# Patient Record
Sex: MALE | Race: WHITE | NOT HISPANIC OR LATINO | Employment: OTHER | URBAN - METROPOLITAN AREA
[De-identification: names, ages, dates, MRNs, and addresses within clinical notes are randomized per-mention and may not be internally consistent; named-entity substitution may affect disease eponyms.]

---

## 2019-09-23 NOTE — PROCEDURE NOTE: SURGICAL
"<span style=""font-weight:bold;"">MR #:</span> [id number]<br /><br /><span style=""font-weight:bold;"">PREOPERATIVE DIAGNOSIS:</span> Cataract

## 2019-10-02 NOTE — PATIENT DISCUSSION
ph of drops slighlty different from patients tears.  Slight sting with insertion of drops.  Eye appears stable.

## 2019-10-07 NOTE — PATIENT DISCUSSION
Cataract surgery has been performed in the first eye and activities of daily living are still impaired. The patient would like to proceed with cataract surgery in the second eye as scheduled. The patient elects Basic OD, goal of Emmetropia.

## 2019-10-07 NOTE — PROCEDURE NOTE: SURGICAL
MR #: [id number]<br /><br />PREOPERATIVE DIAGNOSIS: Cataract, right eye.<br /><br />POSTOPERATIVE DIAGNOSIS: Same.<br /><br />OPERATIVE PROCEDURE: Phacoemulsification with +18.5 diopter Abiel &amp; Abiel AAB00, PC-IOL, right eye.<br /><br />PROCEDURE:&nbsp; Following sedation, Gayatri Mcmillan CRNA, administered topical anesthesia in the form of lidocaine 2% gel as per the anesthetic record. <br /><br />Prior to commencing surgery patient identification, surgical procedure, site, and side were confirmed by Dr. Luiz Chino. &nbsp; The patient was then prepped with Betadine and draped with sterile drapes. A lid speculum was placed and the side port incision prepared. &nbsp; 0.5 cc of Epi-Shugarcaine was instilled and the anterior chamber entered at the temporal 180&deg; limbus in a single plane fashion employing a 2.7 mm trapezoid kevan and ring fixation. Viscoelastic was placed, a circular capsulorhexis performed, hydrodissection accomplished and the nucleus emulsified within the posterior chamber. Cortex was aspirated with the I/A handpiece, the posterior capsule polished and viscoelastic instilled to distend the capsular sac. A +18.5 diopter Abiel &amp; Odessa Dung was placed into the bag under direct visualization without difficulty employing the microinjector. Viscoelastic was aspirated with the I/A handpiece and the anterior chamber pressurized with BSS. The incision was tested for leaks and none were found. A single injection of 0.2 cc of Moxifloxacin was placed in the anterior chamber. The patient returned to the holding area having tolerated the procedure extremely well and without complication. <br /><br />Epi-shugarcaine consists of a mixture of 1cc epinephrine MPF 1:1000, 0.75 cc 4% lidocaine MPF and 2.25 cc BSS. Moxifloxacin consists of a mixture of Vigamox and BSS 1 mg/1 ml solution. <br /><br /><br />

## 2019-10-07 NOTE — PATIENT DISCUSSION
Suggested patient use artificial tears 2 to 3 minutes prior to instilling medication drops to help alleviate stinging.

## 2021-07-26 ENCOUNTER — CATARACT CONSULT (OUTPATIENT)
Dept: URBAN - METROPOLITAN AREA CLINIC 35 | Facility: CLINIC | Age: 76
End: 2021-07-26

## 2021-07-26 DIAGNOSIS — H40.023: ICD-10-CM

## 2021-07-26 DIAGNOSIS — H25.811: ICD-10-CM

## 2021-07-26 DIAGNOSIS — H35.54: ICD-10-CM

## 2021-07-26 DIAGNOSIS — D31.32: ICD-10-CM

## 2021-07-26 DIAGNOSIS — H25.812: ICD-10-CM

## 2021-07-26 PROCEDURE — 92004 COMPRE OPH EXAM NEW PT 1/>: CPT

## 2021-07-26 PROCEDURE — 92134 CPTRZ OPH DX IMG PST SGM RTA: CPT

## 2021-07-26 PROCEDURE — 92015 DETERMINE REFRACTIVE STATE: CPT

## 2021-07-26 PROCEDURE — 92136TC INTERFEROMETRY - TECHNICAL COMPONENT

## 2021-07-26 PROCEDURE — V2799PMN IMPRIMIS PRED-MOXI-NEPAF 5ML

## 2021-07-26 ASSESSMENT — VISUAL ACUITY
OS_BAT: 20/70
OD_CC: 20/40-2
OS_SC: 20/80-1
OD_CC: J3
OS_CC: 20/50-2
OD_BAT: 20/60
OD_SC: 20/70-1
OS_CC: J4

## 2021-07-26 ASSESSMENT — TONOMETRY
OD_IOP_MMHG: 15
OS_IOP_MMHG: 13

## 2021-08-12 ENCOUNTER — SURGERY/PROCEDURE (OUTPATIENT)
Dept: URBAN - METROPOLITAN AREA CLINIC 35 | Facility: CLINIC | Age: 76
End: 2021-08-12

## 2021-08-12 ENCOUNTER — PRE-OP/H&P (OUTPATIENT)
Dept: URBAN - METROPOLITAN AREA SURGERY 14 | Facility: SURGERY | Age: 76
End: 2021-08-12

## 2021-08-12 DIAGNOSIS — H25.812: ICD-10-CM

## 2021-08-12 DIAGNOSIS — H25.811: ICD-10-CM

## 2021-08-12 PROCEDURE — 99211T TECH SERVICE

## 2021-08-12 PROCEDURE — 66984 XCAPSL CTRC RMVL W/O ECP: CPT

## 2021-08-13 ENCOUNTER — CATARACT POST-OP 1-DAY (OUTPATIENT)
Dept: URBAN - METROPOLITAN AREA CLINIC 39 | Facility: CLINIC | Age: 76
End: 2021-08-13

## 2021-08-13 DIAGNOSIS — H25.812: ICD-10-CM

## 2021-08-13 DIAGNOSIS — Z96.1: ICD-10-CM

## 2021-08-13 DIAGNOSIS — H25.811: ICD-10-CM

## 2021-08-13 PROCEDURE — 99024 POSTOP FOLLOW-UP VISIT: CPT

## 2021-08-13 ASSESSMENT — VISUAL ACUITY
OD_CC: J1
OS_SC: 20/30+1
OD_SC: 20/50-2
OS_CC: J1

## 2021-08-13 ASSESSMENT — TONOMETRY
OD_IOP_MMHG: 14
OS_IOP_MMHG: 15

## 2021-08-19 ENCOUNTER — SURGERY/PROCEDURE (OUTPATIENT)
Dept: URBAN - METROPOLITAN AREA CLINIC 35 | Facility: CLINIC | Age: 76
End: 2021-08-19

## 2021-08-19 ENCOUNTER — POST OP/EVAL OF SECOND EYE (OUTPATIENT)
Dept: URBAN - METROPOLITAN AREA CLINIC 39 | Facility: CLINIC | Age: 76
End: 2021-08-19

## 2021-08-19 DIAGNOSIS — H25.812: ICD-10-CM

## 2021-08-19 DIAGNOSIS — H25.811: ICD-10-CM

## 2021-08-19 DIAGNOSIS — Z96.1: ICD-10-CM

## 2021-08-19 PROCEDURE — 99212 OFFICE O/P EST SF 10 MIN: CPT

## 2021-08-19 PROCEDURE — 66984 XCAPSL CTRC RMVL W/O ECP: CPT

## 2021-08-19 ASSESSMENT — VISUAL ACUITY
OD_BAT: 20/60
OD_SC: 20/70-1
OS_SC: 20/25-2

## 2021-08-19 ASSESSMENT — TONOMETRY
OD_IOP_MMHG: 14
OS_IOP_MMHG: 14

## 2021-08-20 ENCOUNTER — CATARACT POST-OP 1-DAY (OUTPATIENT)
Dept: URBAN - METROPOLITAN AREA CLINIC 35 | Facility: CLINIC | Age: 76
End: 2021-08-20

## 2021-08-20 DIAGNOSIS — Z96.1: ICD-10-CM

## 2021-08-20 PROCEDURE — 99024 POSTOP FOLLOW-UP VISIT: CPT

## 2021-08-20 ASSESSMENT — TONOMETRY: OD_IOP_MMHG: 15

## 2021-08-20 ASSESSMENT — VISUAL ACUITY: OD_SC: 20/50-1

## 2021-09-14 ENCOUNTER — POST-OP (OUTPATIENT)
Dept: URBAN - METROPOLITAN AREA CLINIC 35 | Facility: CLINIC | Age: 76
End: 2021-09-14

## 2021-09-14 DIAGNOSIS — H25.811: ICD-10-CM

## 2021-09-14 DIAGNOSIS — Z96.1: ICD-10-CM

## 2021-09-14 DIAGNOSIS — H25.812: ICD-10-CM

## 2021-09-14 PROCEDURE — 99024 POSTOP FOLLOW-UP VISIT: CPT

## 2021-09-14 ASSESSMENT — TONOMETRY
OS_IOP_MMHG: 13
OD_IOP_MMHG: 13

## 2021-09-14 ASSESSMENT — VISUAL ACUITY
OU_CC: J1
OD_SC: 20/40-2
OS_SC: 20/40+1

## 2022-01-17 ENCOUNTER — COMPREHENSIVE EXAM (OUTPATIENT)
Dept: URBAN - METROPOLITAN AREA CLINIC 35 | Facility: CLINIC | Age: 77
End: 2022-01-17

## 2022-01-17 DIAGNOSIS — H35.54: ICD-10-CM

## 2022-01-17 DIAGNOSIS — D31.32: ICD-10-CM

## 2022-01-17 DIAGNOSIS — H40.023: ICD-10-CM

## 2022-01-17 PROCEDURE — 92015 DETERMINE REFRACTIVE STATE: CPT

## 2022-01-17 PROCEDURE — 92012 INTRM OPH EXAM EST PATIENT: CPT

## 2022-01-17 ASSESSMENT — VISUAL ACUITY
OS_CC: 20/25+2
OS_SC: 20/40+2
OS_SC: J12
OD_SC: J12
OD_SC: 20/40+2
OD_CC: 20/30-1

## 2022-01-17 ASSESSMENT — TONOMETRY
OD_IOP_MMHG: 14
OS_IOP_MMHG: 14